# Patient Record
Sex: FEMALE
[De-identification: names, ages, dates, MRNs, and addresses within clinical notes are randomized per-mention and may not be internally consistent; named-entity substitution may affect disease eponyms.]

---

## 2020-12-09 ENCOUNTER — RX ONLY (OUTPATIENT)
Age: 70
Setting detail: RX ONLY
End: 2020-12-09

## 2022-11-14 ENCOUNTER — RX ONLY (OUTPATIENT)
Age: 72
Setting detail: RX ONLY
End: 2022-11-14

## 2024-01-17 ENCOUNTER — APPOINTMENT (RX ONLY)
Dept: URBAN - METROPOLITAN AREA CLINIC 126 | Facility: CLINIC | Age: 74
Setting detail: DERMATOLOGY
End: 2024-01-17

## 2024-01-17 DIAGNOSIS — L98.8 OTHER SPECIFIED DISORDERS OF THE SKIN AND SUBCUTANEOUS TISSUE: ICD-10-CM

## 2024-01-17 PROCEDURE — ? BOTOX

## 2024-01-17 PROCEDURE — ? INVENTORY

## 2024-01-17 PROCEDURE — ? COUNSELING

## 2024-04-17 ENCOUNTER — APPOINTMENT (RX ONLY)
Dept: URBAN - METROPOLITAN AREA CLINIC 126 | Facility: CLINIC | Age: 74
Setting detail: DERMATOLOGY
End: 2024-04-17

## 2024-04-17 DIAGNOSIS — L98.8 OTHER SPECIFIED DISORDERS OF THE SKIN AND SUBCUTANEOUS TISSUE: ICD-10-CM

## 2024-04-17 PROCEDURE — ? BOTOX

## 2024-07-17 ENCOUNTER — APPOINTMENT (RX ONLY)
Dept: URBAN - METROPOLITAN AREA CLINIC 126 | Facility: CLINIC | Age: 74
Setting detail: DERMATOLOGY
End: 2024-07-17

## 2024-07-17 DIAGNOSIS — L98.8 OTHER SPECIFIED DISORDERS OF THE SKIN AND SUBCUTANEOUS TISSUE: ICD-10-CM

## 2024-07-17 PROCEDURE — ? INVENTORY

## 2024-07-17 PROCEDURE — ? BOTOX

## 2024-07-17 PROCEDURE — ? TREATMENT REGIMEN

## 2024-10-16 ENCOUNTER — APPOINTMENT (RX ONLY)
Dept: URBAN - METROPOLITAN AREA CLINIC 126 | Facility: CLINIC | Age: 74
Setting detail: DERMATOLOGY
End: 2024-10-16

## 2024-10-16 DIAGNOSIS — L98.8 OTHER SPECIFIED DISORDERS OF THE SKIN AND SUBCUTANEOUS TISSUE: ICD-10-CM

## 2024-10-16 DIAGNOSIS — R23.8 OTHER SKIN CHANGES: ICD-10-CM

## 2024-10-16 PROCEDURE — ? COSMETIC FOLLOW-UP

## 2024-10-16 PROCEDURE — ? INVENTORY

## 2024-10-16 PROCEDURE — ? BOTOX

## 2024-10-16 NOTE — PROCEDURE: BOTOX
Periorbital Skin Units: 0
Show Additional Area 3: Yes
Consent: Written consent obtained. Risks include but not limited to lid/brow ptosis, bruising, swelling, diplopia, temporary effect, incomplete chemical denervation.
Show Inventory Tab: Show
Post-Care Instructions: Patient instructed to not lie down for 4 hours and limit physical activity for 24 hours.
Dilution (U/0.1 Cc): 4
Incrementing Botox Units: By 0.25 Units
Additional Area 2 Location: Bunny lines: Total units 9
Show Ucl Units: No
Additional Area 6 Location: Perioral: Total units 8. 4 injections of 2 units each were injected into the 4 quadrants of the mouth 1 mm above the vermillion border.
Forehead Units: 8
Additional Area 4 Location: Infraorbital: Total units 4. 2 injections of 2 units each were injected into the bilateral midpupillary lines 2 mm below the eyelid rims
Additional Area 1 Location: Brow lift: 6
Glabellar Complex Units: 25
Additional Area 5 Location: Mentalis: Total units 4
Detail Level: Detailed
Additional Area 1 Units: 6
Additional Area 3 Location: Bitemporal: Total units 4

## 2024-10-16 NOTE — PROCEDURE: COSMETIC FOLLOW-UP
Comments (Free Text): Discussed treatment with Juvederm Voluma.  Price quote given to patient and in attachments.
Detail Level: Zone

## 2025-01-16 ENCOUNTER — APPOINTMENT (OUTPATIENT)
Dept: URBAN - METROPOLITAN AREA CLINIC 126 | Facility: CLINIC | Age: 75
Setting detail: DERMATOLOGY
End: 2025-01-16

## 2025-01-16 DIAGNOSIS — L98.8 OTHER SPECIFIED DISORDERS OF THE SKIN AND SUBCUTANEOUS TISSUE: ICD-10-CM

## 2025-01-16 PROCEDURE — ? TREATMENT REGIMEN

## 2025-01-16 PROCEDURE — ? INVENTORY

## 2025-01-16 PROCEDURE — ? BOTOX

## 2025-01-16 NOTE — PROCEDURE: TREATMENT REGIMEN
Plan: If desire further cosmetic improvement for eyes, see Dr. Manju Ruff.  Referral card given.
Initiate Treatment: Topix eye hydrating serum twice daily.\\nTopix Retinol eye cream twice daily.
Detail Level: Zone

## 2025-02-20 ENCOUNTER — APPOINTMENT (OUTPATIENT)
Dept: URBAN - METROPOLITAN AREA CLINIC 126 | Facility: CLINIC | Age: 75
Setting detail: DERMATOLOGY
End: 2025-02-20

## 2025-02-20 DIAGNOSIS — R23.8 OTHER SKIN CHANGES: ICD-10-CM

## 2025-02-20 PROCEDURE — ? JUVEDERM VOLUMA XC INJECTION

## 2025-02-20 PROCEDURE — ? INVENTORY

## 2025-02-20 NOTE — PROCEDURE: JUVEDERM VOLUMA XC INJECTION
Use Map Statement For Sites (Optional): No
Decollete Filler Volume In Cc: 0
Additional Anesthesia Volume In Cc: 6
Post-Care Instructions: Patient instructed to apply ice to reduce swelling.
Consent: Written consent obtained. Risks include but not limited to bruising, beading, irregular texture, ulceration, infection, allergic reaction, scar formation, incomplete augmentation, temporary nature, procedural pain.
Anesthesia Type: 1% lidocaine with epinephrine
Show Inventory Tab: Hide
Number Of Syringes (Required For Inventory): 1
Anesthesia Volume In Cc: 0.5
Map Statment: See Attach Map for Complete Details
Procedural Text: Filler Procedure Note\\n\\nSite : bilateral malar , bilateral mandibular angles volume loss central face , prejowl sulci\\n\\nProduct: Voluma XC\\n\\n Anesthesia: Ice\\n\\nProcedure:\\nThe risks and benefits of injection with hyaluronic acid fillers were discussed with the patient, including\\nthe risk of : pain, allergic reaction, bleeding, bruising, swelling, scar, ulceration, nodules, recurrence of\\nthe treated facial lines and need for retreatment, Julia effect , discoloration of the skin, skin\\nnecrosis, infection, activation of the Herpes Simplex virus, delayed hypersensitivity reaction.\\nWritten consent was obtained. The patient was placed in a seated upright position. Landmarks were\\nobtained. Photographs were taken. The sites of injection were cleansed with chlorhexidine gluconate, Puracyn, and then alcohol. Ice was applied.\\nThen the product was injected as follows: the bilateral malar regions, bilateral mandibular angles  and central mid cheek were injected . Via cannula the prejowl sulci  were injected.  The product was then massaged until smooth.\\nComplications: none\\nPost operative care: Postoperative photographs taken. Postoperative instructions were given . Ice\\npacks were given to apply p.r.n. Tylenol p.r.n. Discussed Arnica po and topical. Do not apply make up for\\n24 hours. Avoid exercise for 24 hours. Avoid excessive sun exposure.\\nPostoperative medications: Tylenol prn pain.\\nTotal syringe of product used :\\nFollow up: 4 weeks . The patient was given my cell phone number to call if assistance should be\\nneeded.
Filler: Juvederm Voluma XC
Detail Level: Detailed
Cheeks Filler Volume In Cc: 2

## 2025-03-20 ENCOUNTER — APPOINTMENT (OUTPATIENT)
Dept: URBAN - METROPOLITAN AREA CLINIC 126 | Facility: CLINIC | Age: 75
Setting detail: DERMATOLOGY
End: 2025-03-20

## 2025-03-20 DIAGNOSIS — R23.8 OTHER SKIN CHANGES: ICD-10-CM

## 2025-03-20 PROCEDURE — ? COSMETIC FOLLOW-UP

## 2025-03-20 PROCEDURE — ? INVENTORY

## 2025-03-20 NOTE — PROCEDURE: COSMETIC FOLLOW-UP
Comments (Free Text): Patient is post 2 syringes of Juvederm Voluma to bilateral malar, bilateral mandibular angles, and jowls.  She has had an excellent result.
Detail Level: Zone

## 2025-04-17 ENCOUNTER — APPOINTMENT (OUTPATIENT)
Dept: URBAN - METROPOLITAN AREA CLINIC 126 | Facility: CLINIC | Age: 75
Setting detail: DERMATOLOGY
End: 2025-04-17

## 2025-04-17 DIAGNOSIS — L98.8 OTHER SPECIFIED DISORDERS OF THE SKIN AND SUBCUTANEOUS TISSUE: ICD-10-CM

## 2025-04-17 PROCEDURE — ? INVENTORY

## 2025-04-17 PROCEDURE — ? BOTOX

## 2025-04-17 NOTE — PROCEDURE: BOTOX
Periorbital Skin Units: 0
Show Additional Area 3: Yes
Consent: Written consent obtained. Risks include but not limited to lid/brow ptosis, bruising, swelling, diplopia, temporary effect, incomplete chemical denervation.
Show Inventory Tab: Show
Post-Care Instructions: Patient instructed to not lie down for 4 hours and limit physical activity for 24 hours.
Dilution (U/0.1 Cc): 4
Incrementing Botox Units: By 0.25 Units
Additional Area 2 Location: Bunny lines: Total units 9
Show Ucl Units: No
Additional Area 6 Location: Perioral: Total units 8. 4 injections of 2 units each were injected into the 4 quadrants of the mouth 1 mm above the vermillion border.
Forehead Units: 8
Additional Area 4 Location: Infraorbital: Total units 4. 2 injections of 2 units each were injected into the bilateral midpupillary lines 2 mm below the eyelid rims
Additional Area 1 Location: Brow lift: 
Glabellar Complex Units: 25
Additional Area 5 Location: Mentalis: Total units 4
Detail Level: Detailed
Additional Area 3 Location: Bitemporal: Total units 4

## 2025-07-17 ENCOUNTER — APPOINTMENT (OUTPATIENT)
Dept: URBAN - METROPOLITAN AREA CLINIC 126 | Facility: CLINIC | Age: 75
Setting detail: DERMATOLOGY
End: 2025-07-17

## 2025-07-17 DIAGNOSIS — L98.8 OTHER SPECIFIED DISORDERS OF THE SKIN AND SUBCUTANEOUS TISSUE: ICD-10-CM

## 2025-07-17 PROCEDURE — ? BOTOX

## 2025-07-17 PROCEDURE — ? ADDITIONAL NOTES

## 2025-07-17 PROCEDURE — ? INVENTORY

## 2025-07-17 NOTE — PROCEDURE: ADDITIONAL NOTES
Render Risk Assessment In Note?: no
Detail Level: Simple
Additional Notes: Advised to start Eucerin Radiant Tone line \\nStock size of all three products given today \\nMake the Topix Retinol the last step at night \\nA few times a week exfoliate with the Topix AHA/ BHA exfoliating cleanser
